# Patient Record
Sex: MALE | Race: WHITE | NOT HISPANIC OR LATINO
[De-identification: names, ages, dates, MRNs, and addresses within clinical notes are randomized per-mention and may not be internally consistent; named-entity substitution may affect disease eponyms.]

---

## 2021-04-28 PROBLEM — Z00.00 ENCOUNTER FOR PREVENTIVE HEALTH EXAMINATION: Status: ACTIVE | Noted: 2021-04-28

## 2021-04-29 ENCOUNTER — APPOINTMENT (OUTPATIENT)
Dept: ORTHOPEDIC SURGERY | Facility: CLINIC | Age: 36
End: 2021-04-29
Payer: MEDICAID

## 2021-04-29 DIAGNOSIS — S52.125A NONDISPLACED FRACTURE OF HEAD OF LEFT RADIUS, INITIAL ENCOUNTER FOR CLOSED FRACTURE: ICD-10-CM

## 2021-04-29 PROCEDURE — 99204 OFFICE O/P NEW MOD 45 MIN: CPT

## 2021-04-29 PROCEDURE — 73070 X-RAY EXAM OF ELBOW: CPT | Mod: LT

## 2021-04-29 PROCEDURE — 99072 ADDL SUPL MATRL&STAF TM PHE: CPT

## 2021-04-29 NOTE — ASSESSMENT
[FreeTextEntry1] : My pressure is that the patient has a stable, minimally displaced and healing left radial head fracture. Given his full left elbow range of motion, I recommended he discontinue the splint and use his left elbow as tolerated. I did caution him to not strained his left elbow or lift/carry anything heavy for the next 2-4 weeks. He will plan to follow with me on an as-needed basis.

## 2021-04-29 NOTE — PHYSICAL EXAM
IV BOLUS DONE, IVF NS AT 30ML STARTED, PT REMAINED TACHYPNEIC AND LABORED BREATHING, 
CONTINUE ON BIPAP, MONITORED CLOSELY. [de-identified] : Physical exam demonstrates the patient to be alert and oriented x 3 and capable of ambulation. The patient is well-developed and well-nourished in no apparent respiratory distress. The majority of the skin is intact bilaterally in the upper extremities without any bilateral elbow lymphadenopathy. \par \par Evaluation of both elbows reveals full symmetric passive & active range of motion from full extension to 140° of flexion with full pronation and full supination. Nontender over medial and lateral epicondyles bilaterally. Nontender over the left radial head. Biceps and triceps intact with excellent strength bilaterally. No palpable elbow effusion bilaterally. No signs of instability bilaterally including valgus instability and posterior lateral rotatory instability. Excellent brachial artery pulse (2+) bilaterally. Negative Tinel's over the cubital tunnel on the left. \par \par The wrists have symmetric range of motion bilaterally. There is no tenderness over the scaphoid, scapholunate, or lunotriquetral ligaments bilaterally. There is a negative March's test bilaterally. There is no tenderness over the distal radial ulnar joint or TFCC and no evidence of instability bilaterally. There is no tenderness over the pisotriquetral joint, hamate hook, or CMC joints bilaterally. The patient is nontender over both scaphoids and anatomic snuffbox is bilaterally. There is no clubbing cyanosis or edema.\par \par Full, symmetric digital range of motion.\par \par There is good capillary refill of the digits bilaterally. There are no masses palpated or sensitivity over the median and ulnar nerves at the level of the wrist. There is a negative Tinel's and negative Phalen's and a negative carpal tunnel compression test bilaterally. Sensation is intact to light touch bilaterally.\par \par \par  [de-identified] : Radiographs of the left elbow and wrist were examined from [facility] 8 days ago. There is evidence of a minimally displaced, Sanchez 1, radial head fracture. No acute fracture, dislocation, malalignment or deformity is appreciated at the wrist. X-rays taken today in the office of the left elbow demonstrate interval healing of his radial head fracture.
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
